# Patient Record
Sex: FEMALE | Race: WHITE | NOT HISPANIC OR LATINO | URBAN - NONMETROPOLITAN AREA
[De-identification: names, ages, dates, MRNs, and addresses within clinical notes are randomized per-mention and may not be internally consistent; named-entity substitution may affect disease eponyms.]

---

## 2024-10-12 ENCOUNTER — OFFICE VISIT (OUTPATIENT)
Dept: URGENT CARE | Age: 64
End: 2024-10-12
Payer: MEDICARE

## 2024-10-12 ENCOUNTER — HOSPITAL ENCOUNTER (EMERGENCY)
Facility: HOSPITAL | Age: 64
Discharge: HOME | End: 2024-10-12
Payer: MEDICARE

## 2024-10-12 VITALS
OXYGEN SATURATION: 95 % | BODY MASS INDEX: 39.24 KG/M2 | SYSTOLIC BLOOD PRESSURE: 133 MMHG | HEIGHT: 67 IN | TEMPERATURE: 98.2 F | RESPIRATION RATE: 18 BRPM | WEIGHT: 250 LBS | DIASTOLIC BLOOD PRESSURE: 70 MMHG | HEART RATE: 71 BPM

## 2024-10-12 VITALS
DIASTOLIC BLOOD PRESSURE: 79 MMHG | TEMPERATURE: 97.5 F | HEIGHT: 67 IN | SYSTOLIC BLOOD PRESSURE: 148 MMHG | BODY MASS INDEX: 40.28 KG/M2 | WEIGHT: 256.62 LBS | OXYGEN SATURATION: 97 % | HEART RATE: 70 BPM | RESPIRATION RATE: 18 BRPM

## 2024-10-12 DIAGNOSIS — S81.811A LACERATION OF RIGHT LOWER LEG, INITIAL ENCOUNTER: Primary | ICD-10-CM

## 2024-10-12 PROCEDURE — 4500999001 HC ED NO CHARGE

## 2024-10-12 RX ORDER — METHOTREXATE 2.5 MG/1
TABLET ORAL
COMMUNITY
Start: 2012-05-07

## 2024-10-12 RX ORDER — DULOXETIN HYDROCHLORIDE 30 MG/1
CAPSULE, DELAYED RELEASE ORAL
COMMUNITY
Start: 2024-10-12

## 2024-10-12 RX ORDER — CEPHALEXIN 500 MG/1
500 CAPSULE ORAL 2 TIMES DAILY
Qty: 6 CAPSULE | Refills: 0 | Status: SHIPPED | OUTPATIENT
Start: 2024-10-12 | End: 2024-10-15

## 2024-10-12 RX ORDER — HYDROXYCHLOROQUINE SULFATE 200 MG/1
TABLET, FILM COATED ORAL
COMMUNITY

## 2024-10-12 ASSESSMENT — ENCOUNTER SYMPTOMS
CHILLS: 0
SHORTNESS OF BREATH: 0
NAUSEA: 0
NUMBNESS: 0
FEVER: 0
WEAKNESS: 0
COLOR CHANGE: 0
VOMITING: 0
FATIGUE: 0
WOUND: 1
ARTHRALGIAS: 0
MYALGIAS: 0
DIZZINESS: 0
JOINT SWELLING: 0

## 2024-10-12 ASSESSMENT — PAIN SCALES - GENERAL: PAINLEVEL_OUTOF10: 4

## 2024-10-12 ASSESSMENT — COLUMBIA-SUICIDE SEVERITY RATING SCALE - C-SSRS
1. IN THE PAST MONTH, HAVE YOU WISHED YOU WERE DEAD OR WISHED YOU COULD GO TO SLEEP AND NOT WAKE UP?: NO
6. HAVE YOU EVER DONE ANYTHING, STARTED TO DO ANYTHING, OR PREPARED TO DO ANYTHING TO END YOUR LIFE?: NO
2. HAVE YOU ACTUALLY HAD ANY THOUGHTS OF KILLING YOURSELF?: NO

## 2024-10-12 ASSESSMENT — PAIN DESCRIPTION - ORIENTATION: ORIENTATION: RIGHT

## 2024-10-12 ASSESSMENT — PAIN - FUNCTIONAL ASSESSMENT: PAIN_FUNCTIONAL_ASSESSMENT: 0-10

## 2024-10-12 ASSESSMENT — PAIN DESCRIPTION - PAIN TYPE: TYPE: ACUTE PAIN

## 2024-10-12 ASSESSMENT — PAIN DESCRIPTION - LOCATION: LOCATION: LEG

## 2024-10-12 NOTE — ED TRIAGE NOTES
Pt arrives ambulatory to [facility] with a right lower leg laceration from her bed frame. Pt denies any blood thinners, bleeding mostly controlled upon arrival. Pt's laceration dressed with non-adherent and kerlex stretch bandage. Pt unsure when her last tetanus shot was. Pt A&Ox4, resp easy and unlabored, skin of appropriate color.

## 2024-10-12 NOTE — PATIENT INSTRUCTIONS
Patient sustained laceration to the right lower leg. Before performing laceration repair informed as well as written consent was obtained. I did review risks and benefits of laceration repair to the patient. I did review patient tetanus status and was updated as necessary. Laceration was thoroughly cleaned and evaluated by me. Patient was evaluated and deemed to be neurovascularly intact. Laceration was cleaned with Hibiclens prior to laceration repair. After thorough cleaning laceration was draped In a sterile fashion and sterile technique was performed during the procedure. After cleaning and sterilizing patient field. I used lidocaine without epinephrine through superficial infiltrative techniques. After lidocaine introduction I then placed 15, 3-0 prolene stitches. After placement the wound was then cleansed and topical antibiotic was applied. Patient wound was then bandaged prior to discharge.     Patient was educated regarding discharge instructions. Patient educated to keep wound bandaged for the first 48 hours. Patient is to change the bandage daily or anytime it becomes soiled. During the entirety of the healing process wound cannot become submerged in water. You may gently pat soap and water after 48-72 hours for cleaning. If you develop any signs of infection including warmth, pustular drainage, pain, or swelling you must immediately return to the clinic for evaluation. Please RTC or follow up with your PCP for suture removal in 7-10 Days.

## 2024-10-12 NOTE — PROGRESS NOTES
Subjective   Patient ID: Susanna Head is a 64 y.o. female. They present today with a chief complaint of Injury (Laceration to right lower leg near the ankle, pt state she hit it on a metal bed frame x 1 hour ago).    History of Present Illness  Patient is a 64-year-old female presenting to the clinic with complaints of a laceration of her right lower extremity.  Laceration occurred from the side of a bed frame.  Patient states laceration occurred 1 hour ago over the right lower shin.  Patient denies any numbness, tingling or weakness.  Last tetanus in 2017.  No active bleeding.  Patient spent 1 hour waiting in the ER and decided to come to the urgent care instead.      History provided by:  Patient  Injury  Associated symptoms: no chest pain, no fatigue, no fever, no myalgias, no nausea, no shortness of breath and no vomiting        Past Medical History  Allergies as of 10/12/2024 - Reviewed 10/12/2024   Allergen Reaction Noted    Morphine Nausea/vomiting 10/12/2024       (Not in a hospital admission)       Past Medical History:   Diagnosis Date    Disease of jaws, unspecified     Disorder of jaw       Past Surgical History:   Procedure Laterality Date    BREAST SURGERY  09/18/2012    Breast Surgery            Review of Systems  Review of Systems   Constitutional:  Negative for chills, fatigue and fever.   Respiratory:  Negative for shortness of breath.    Cardiovascular:  Negative for chest pain.   Gastrointestinal:  Negative for nausea and vomiting.   Musculoskeletal:  Negative for arthralgias, gait problem, joint swelling and myalgias.   Skin:  Positive for wound. Negative for color change and pallor.   Neurological:  Negative for dizziness, weakness and numbness.                                  Objective    Vitals:    10/12/24 1845   BP: 148/79   BP Location: Left arm   Patient Position: Sitting   BP Cuff Size: Large adult   Pulse: 70   Resp: 18   Temp: 36.4 °C (97.5 °F)   TempSrc: Oral   SpO2: 97%   Weight:  "116 kg (256 lb 9.9 oz)   Height: 1.702 m (5' 7\")     No LMP recorded.    Physical Exam  Vitals reviewed.   Constitutional:       General: She is not in acute distress.     Appearance: Normal appearance. She is not ill-appearing or toxic-appearing.   HENT:      Head: Normocephalic and atraumatic.   Cardiovascular:      Rate and Rhythm: Normal rate and regular rhythm.      Heart sounds: Normal heart sounds. No murmur heard.     No friction rub. No gallop.   Pulmonary:      Effort: Pulmonary effort is normal. No respiratory distress.      Breath sounds: Normal breath sounds. No stridor. No wheezing, rhonchi or rales.   Musculoskeletal:         General: No swelling. Normal range of motion.      Comments: Normal range of motion over right ankle, normal dorsiflexion and plantarflexion, normal range of motion of her right knee.   Skin:     Comments: Laceration in a V shape over right lower extremity.  Over shin.  Anterior.  7 cm in length.  Depth to just below the dermis some visible subcutaneous tissue.  No nerves, vessels, tendons or muscle visible.  No bone visible.  No signs of foreign bodies.   Neurological:      General: No focal deficit present.      Mental Status: She is alert and oriented to person, place, and time.      Sensory: No sensory deficit.      Motor: No weakness.      Gait: Gait normal.      Comments: Normal sensation over right lower extremity.   Psychiatric:         Mood and Affect: Mood normal.         Behavior: Behavior normal.         Laceration Repair    Date/Time: 10/12/2024 9:56 PM    Performed by: Jasiel Osborn PA-C  Authorized by: Jasiel Osborn PA-C    Consent:     Consent obtained:  Verbal and written    Consent given by:  Patient    Risks, benefits, and alternatives were discussed: yes      Risks discussed:  Infection, pain, retained foreign body, tendon damage, vascular damage, poor wound healing, poor cosmetic result, need for additional repair and nerve damage  Universal protocol: "     Relevant documents present and verified: yes      Immediately prior to procedure, a time out was called: yes      Patient identity confirmed:  Verbally with patient  Anesthesia:     Anesthesia method:  Local infiltration    Local anesthetic:  Lidocaine 1% w/o epi  Laceration details:     Location:  Leg    Leg location:  R lower leg    Length (cm):  7    Depth (mm):  3  Pre-procedure details:     Preparation:  Patient was prepped and draped in usual sterile fashion  Exploration:     Imaging outcome: foreign body not noted      Wound exploration: entire depth of wound visualized      Wound extent: no foreign bodies/material noted, no muscle damage noted, no nerve damage noted, no tendon damage noted and no vascular damage noted      Contaminated: no    Treatment:     Area cleansed with:  Chlorhexidine    Amount of cleaning:  Standard    Visualized foreign bodies/material removed: no    Skin repair:     Repair method:  Sutures    Suture size:  3-0    Suture material:  Prolene    Suture technique:  Simple interrupted    Number of sutures:  15  Approximation:     Approximation:  Close  Repair type:     Repair type:  Simple  Post-procedure details:     Dressing:  Antibiotic ointment, non-adherent dressing and sterile dressing    Procedure completion:  Tolerated well, no immediate complications      Point of Care Test & Imaging Results from this visit  No results found for this visit on 10/12/24.   No results found.    Diagnostic study results (if any) were reviewed by [facility].    Assessment/Plan   Allergies, medications, history, and pertinent labs/EKGs/Imaging reviewed by Jasiel Brown PA-C.     Medical Decision Making  Patient is a 64-year-old female presenting to the clinic with complaints of a laceration of her right lower extremity.  Laceration occurred from the side of a bed frame.  Patient states laceration occurred 1 hour ago over the right lower shin.  Patient denies any numbness, tingling or  weakness.  Last tetanus in 2017.  No active bleeding.  Patient spent 1 hour waiting in the ER and decided to come to the urgent care instead. Laceration in a V shape over right lower extremity.  Over shin.  Anterior.  7 cm in length.  Depth to just below the dermis some visible subcutaneous tissue.  No nerves, vessels, tendons or muscle visible.  No bone visible.  No signs of foreign bodies.  Vital signs in the clinic are stable.  Patient requested tetanus update.  Patient requested prophylactic antibiotics secondary to old bed frame.    Patient sustained laceration to the right lower leg. Before performing laceration repair informed as well as written consent was obtained. I did review risks and benefits of laceration repair to the patient. I did review patient tetanus status and was updated as necessary. Laceration was thoroughly cleaned and evaluated by me. Patient was evaluated and deemed to be neurovascularly intact. Laceration was cleaned with Hibiclens prior to laceration repair. After thorough cleaning laceration was draped In a sterile fashion and sterile technique was performed during the procedure. After cleaning and sterilizing patient field. I used lidocaine without epinephrine through superficial infiltrative techniques. After lidocaine introduction I then placed 15, 3-0 prolene stitches. After placement the wound was then cleansed and topical antibiotic was applied. Patient wound was then bandaged prior to discharge.     Patient was educated regarding discharge instructions. Patient educated to keep wound bandaged for the first 48 hours. Patient is to change the bandage daily or anytime it becomes soiled. During the entirety of the healing process wound cannot become submerged in water. You may gently pat soap and water after 48-72 hours for cleaning. If you develop any signs of infection including warmth, pustular drainage, pain, or swelling you must immediately return to the clinic for evaluation.  Please RTC or follow up with your PCP for suture removal in 7-10 Days.     Patient educated if she develops any weakness, numbness, tingling, intense uncontrollable pain, worsening symptoms to go to the emergency room for reevaluation.    Orders and Diagnoses  There are no diagnoses linked to this encounter.    Medical Admin Record      Patient disposition: Home    Electronically signed by [facility]  6:47 PM